# Patient Record
Sex: MALE | Race: WHITE | NOT HISPANIC OR LATINO | Employment: FULL TIME | URBAN - METROPOLITAN AREA
[De-identification: names, ages, dates, MRNs, and addresses within clinical notes are randomized per-mention and may not be internally consistent; named-entity substitution may affect disease eponyms.]

---

## 2020-06-10 ENCOUNTER — APPOINTMENT (EMERGENCY)
Dept: RADIOLOGY | Facility: HOSPITAL | Age: 45
End: 2020-06-10
Payer: COMMERCIAL

## 2020-06-10 ENCOUNTER — HOSPITAL ENCOUNTER (EMERGENCY)
Facility: HOSPITAL | Age: 45
Discharge: HOME | End: 2020-06-10
Attending: EMERGENCY MEDICINE | Admitting: EMERGENCY MEDICINE
Payer: COMMERCIAL

## 2020-06-10 VITALS
WEIGHT: 225 LBS | DIASTOLIC BLOOD PRESSURE: 91 MMHG | RESPIRATION RATE: 18 BRPM | OXYGEN SATURATION: 96 % | SYSTOLIC BLOOD PRESSURE: 162 MMHG | HEART RATE: 62 BPM | TEMPERATURE: 98.6 F

## 2020-06-10 DIAGNOSIS — S43.014A ANTERIOR DISLOCATION OF RIGHT SHOULDER, INITIAL ENCOUNTER: Primary | ICD-10-CM

## 2020-06-10 PROCEDURE — 99284 EMERGENCY DEPT VISIT MOD MDM: CPT | Performed by: EMERGENCY MEDICINE

## 2020-06-10 PROCEDURE — 99284 EMERGENCY DEPT VISIT MOD MDM: CPT

## 2020-06-10 PROCEDURE — 23650 CLTX SHO DSLC W/MNPJ WO ANES: CPT | Performed by: EMERGENCY MEDICINE

## 2020-06-10 PROCEDURE — 73030 X-RAY EXAM OF SHOULDER: CPT

## 2020-06-10 RX ORDER — PROPOFOL 10 MG/ML
100 INJECTION, EMULSION INTRAVENOUS ONCE
Status: COMPLETED | OUTPATIENT
Start: 2020-06-10 | End: 2020-06-10

## 2020-06-10 RX ADMIN — PROPOFOL 100 MG: 10 INJECTION, EMULSION INTRAVENOUS at 15:30

## 2020-06-17 VITALS
SYSTOLIC BLOOD PRESSURE: 171 MMHG | BODY MASS INDEX: 33.33 KG/M2 | HEART RATE: 60 BPM | DIASTOLIC BLOOD PRESSURE: 98 MMHG | HEIGHT: 69 IN | WEIGHT: 225 LBS

## 2020-06-17 DIAGNOSIS — M24.411 RECURRENT DISLOCATION OF RIGHT SHOULDER: Primary | ICD-10-CM

## 2020-06-17 PROCEDURE — 99203 OFFICE O/P NEW LOW 30 MIN: CPT | Performed by: ORTHOPAEDIC SURGERY

## 2020-07-15 VITALS
WEIGHT: 225 LBS | HEIGHT: 69 IN | SYSTOLIC BLOOD PRESSURE: 177 MMHG | HEART RATE: 71 BPM | DIASTOLIC BLOOD PRESSURE: 106 MMHG | BODY MASS INDEX: 33.33 KG/M2

## 2020-07-15 DIAGNOSIS — M24.411 RECURRENT DISLOCATION OF RIGHT SHOULDER: Primary | ICD-10-CM

## 2020-07-15 PROCEDURE — 99213 OFFICE O/P EST LOW 20 MIN: CPT | Performed by: ORTHOPAEDIC SURGERY

## 2020-07-15 NOTE — PROGRESS NOTES
Assessment/Plan:  1  Recurrent dislocation of right shoulder  Ambulatory referral to Physical Therapy     Patient Active Problem List   Diagnosis    Recurrent dislocation of right shoulder       Discussion/Summary:    39 y o  male  With recurrent right shoulder dislocations most recently over 5 weeks ago  He will be referred to physical therapy to begin with non operative shoulder dislocation protocol  He will follow up in 6 weeks for repeat exam and evaluation   if he continues to have instability despite conservative measures than MRI might be considered     The assessment and plan were formulated by Dr Tere Hall and I assisted in carrying it out  Subjective:   Patient ID: Leonela Antonio is a 39 y o  male   HPI    Patient presents to the office for follow up of  Right shoulder dislocation  Since the last visit, the patient reports  He has been compliant with wearing the abduction shoulder brace  He is having mild intermittent pain and tightness in the right shoulder  No radiation of pain      Denies any numbness or tingling  Patient  denies any new injuries to the  Right shoulder since the last visit  The following portions of the patient's history were reviewed and updated as appropriate: allergies, current medications, past family history, past social history, past surgical history and problem list     Social History     Socioeconomic History    Marital status: /Civil Union     Spouse name: Not on file    Number of children: Not on file    Years of education: Not on file    Highest education level: Not on file   Occupational History    Not on file   Social Needs    Financial resource strain: Not on file    Food insecurity:     Worry: Not on file     Inability: Not on file    Transportation needs:     Medical: Not on file     Non-medical: Not on file   Tobacco Use    Smoking status: Never Smoker    Smokeless tobacco: Never Used   Substance and Sexual Activity    Alcohol use:  Yes Frequency: Monthly or less     Binge frequency: Never     Comment: rarely    Drug use: Never    Sexual activity: Not on file   Lifestyle    Physical activity:     Days per week: Not on file     Minutes per session: Not on file    Stress: Not on file   Relationships    Social connections:     Talks on phone: Not on file     Gets together: Not on file     Attends [employer] service: Not on file     Active member of club or organization: Not on file     Attends meetings of clubs or organizations: Not on file     Relationship status: Not on file    Intimate partner violence:     Fear of current or ex partner: Not on file     Emotionally abused: Not on file     Physically abused: Not on file     Forced sexual activity: Not on file   Other Topics Concern    Not on file   Social History Narrative    Not on file     No past medical history on file  Past Surgical History:   Procedure Laterality Date    FRACTURE SURGERY       No Known Allergies  No current outpatient medications on file prior to visit  No current facility-administered medications on file prior to visit  Review of Systems      Objective:    Vitals:    07/15/20 0958   BP: (!) 177/106   Pulse: 71       Physical Exam   Constitutional: He is oriented to person, place, and time  He appears well-developed and well-nourished  No distress  HENT:   Head: Normocephalic and atraumatic  Eyes: Conjunctivae are normal  No scleral icterus  Neck: Neck supple  No tracheal deviation present  Cardiovascular:   No discernible arrhthymias    Pulmonary/Chest: Effort normal  No respiratory distress  Neurological: He is alert and oriented to person, place, and time  Skin: Skin is warm and dry  Psychiatric: He has a normal mood and affect  His behavior is normal        Right Shoulder Exam     Tenderness   The patient is experiencing no tenderness      Range of Motion   Active abduction:  100 abnormal   Passive abduction:  120 abnormal   External rotation:  50 abnormal   Forward flexion:  140 abnormal   Internal rotation 90 degrees:  40 abnormal     Muscle Strength   The patient has normal right shoulder strength  Tests   Apprehension: negative  Tracy test: negative  Impingement: negative    Other   Erythema: absent  Scars: absent  Sensation: normal  Pulse: present              Procedures  No Procedures performed today    Portions of the record may have been created with voice recognition software  Occasional wrong word or "sound a like" substitutions may have occurred due to the inherent limitations of voice recognition software  Read the chart carefully and recognize, using context, where substitutions have occurred

## 2020-07-15 NOTE — LETTER
July 15, 2020     Patient: Isael Jansen   YOB: 1975   Date of Visit: 7/15/2020       To Whom it May Concern:    Karla Martinez is under my professional care  He was seen in my office on 7/15/2020  He may return to work with limitations no overhead lifting with right shoulder no reaching behind back with right shoulder no lifting greater than 5 lbs with right upper extremity  If you have any questions or concerns, please don't hesitate to call           Sincerely,          Hector Bullock DO        CC: No Recipients

## 2020-07-16 NOTE — TELEPHONE ENCOUNTER
Elvia from [facility] is calling for PT script to be faxed to [phone]        Faxed per request

## 2020-08-20 ENCOUNTER — TELEPHONE (OUTPATIENT)
Dept: OBGYN CLINIC | Facility: MEDICAL CENTER | Age: 45
End: 2020-08-20

## 2020-08-20 DIAGNOSIS — M24.411 RECURRENT DISLOCATION OF RIGHT SHOULDER: Primary | ICD-10-CM

## 2020-08-20 NOTE — TELEPHONE ENCOUNTER
Patient sees Dr Jason Fabian at [facility] requesting another Physical Therapy script for another 2 months, he is going twice a week        Please fax to [phone]

## 2020-08-26 VITALS
BODY MASS INDEX: 34.51 KG/M2 | HEART RATE: 56 BPM | HEIGHT: 69 IN | SYSTOLIC BLOOD PRESSURE: 166 MMHG | WEIGHT: 233 LBS | DIASTOLIC BLOOD PRESSURE: 106 MMHG

## 2020-08-26 DIAGNOSIS — M24.411 RECURRENT DISLOCATION OF RIGHT SHOULDER: Primary | ICD-10-CM

## 2020-08-26 PROCEDURE — 99212 OFFICE O/P EST SF 10 MIN: CPT | Performed by: ORTHOPAEDIC SURGERY

## 2020-08-26 NOTE — LETTER
August 26, 2020     Patient: Erwin Basurto   YOB: 1975   Date of Visit: 8/26/2020       To Whom it May Concern:    Kerry Bhagat is under my professional care  He was seen in my office on 8/26/2020  He may return to work on 8/27 no restrictions  If you have any questions or concerns, please don't hesitate to call           Sincerely,          Leland Messer DO        CC: No Recipients

## 2020-08-26 NOTE — PROGRESS NOTES
Assessment:  1  Recurrent dislocation of right shoulder       Patient Active Problem List   Diagnosis    Recurrent dislocation of right shoulder           Plan      Continue with physical therapy to work on and range of motion and glenohumeral mechanics  Follow-up in 6 weeks at which time the should have achieved full range of motion passively and actively  I did explain to him I want him to avoid overhead reaching he said he will do that he wants to go back to work needs to go back to work and at home that would be the only restriction that I would say but if he has any restrictions he would be able go back to work  So I am going to let him go back to work unrestricted but I want him to keep in mind if he puts his hand reaches above his head or out really far to reach and grab something he can cause re-injury and he understands this and agrees to this  Subjective:     Patient ID:    Chief Complaint:Pradip Terrell 39 y o  male      HPI    Patient comes in today with recurrent dislocations been over 2 months from his last dislocation  He has been working with physical therapy he reports significant improvement    He would like to return to work      The following portions of the patient's history were reviewed and updated as appropriate: allergies, current medications, past family history, past social history, past surgical history and problem list     All organ systems normal    Social History     Socioeconomic History    Marital status: /Civil Union     Spouse name: Not on file    Number of children: Not on file    Years of education: Not on file    Highest education level: Not on file   Occupational History    Not on file   Social Needs    Financial resource strain: Not on file    Food insecurity     Worry: Not on file     Inability: Not on file    Transportation needs     Medical: Not on file     Non-medical: Not on file   Tobacco Use    Smoking status: Never Smoker    Smokeless tobacco: Never Used   Substance and Sexual Activity    Alcohol use: Yes     Frequency: Monthly or less     Binge frequency: Never     Comment: rarely    Drug use: Never    Sexual activity: Not on file   Lifestyle    Physical activity     Days per week: Not on file     Minutes per session: Not on file    Stress: Not on file   Relationships    Social connections     Talks on phone: Not on file     Gets together: Not on file     Attends [employer] service: Not on file     Active member of club or organization: Not on file     Attends meetings of clubs or organizations: Not on file     Relationship status: Not on file    Intimate partner violence     Fear of current or ex partner: Not on file     Emotionally abused: Not on file     Physically abused: Not on file     Forced sexual activity: Not on file   Other Topics Concern    Not on file   Social History Narrative    Not on file     No past medical history on file  Past Surgical History:   Procedure Laterality Date    FRACTURE SURGERY       No Known Allergies  No current outpatient medications on file prior to visit  No current facility-administered medications on file prior to visit  Objective:        Ortho Exam    Range of motion almost normally does have some limitation in external and internal rotation and abduction just by couple degrees    All special tests for labrum rotator cuff tendons biceps all negative for any pain or instability    Portions of the record may have been created with voice recognition software   Occasional wrong word or "sound a like" substitutions may have occurred due to the inherent limitations of voice recognition software   Read the chart carefully and recognize, using context, where substitutions have occurred

## 2020-10-26 VITALS
HEART RATE: 61 BPM | WEIGHT: 231 LBS | SYSTOLIC BLOOD PRESSURE: 191 MMHG | DIASTOLIC BLOOD PRESSURE: 117 MMHG | HEIGHT: 69 IN | BODY MASS INDEX: 34.21 KG/M2

## 2020-10-26 DIAGNOSIS — M24.411 RECURRENT DISLOCATION OF RIGHT SHOULDER: Primary | ICD-10-CM

## 2020-10-26 PROCEDURE — 99213 OFFICE O/P EST LOW 20 MIN: CPT | Performed by: ORTHOPAEDIC SURGERY
